# Patient Record
Sex: MALE | Race: WHITE | Employment: OTHER | URBAN - METROPOLITAN AREA
[De-identification: names, ages, dates, MRNs, and addresses within clinical notes are randomized per-mention and may not be internally consistent; named-entity substitution may affect disease eponyms.]

---

## 2022-08-17 RX ORDER — M-VIT,TX,IRON,MINS/CALC/FOLIC 27MG-0.4MG
1 TABLET ORAL DAILY
COMMUNITY

## 2022-08-17 RX ORDER — MONTELUKAST SODIUM 10 MG/1
10 TABLET ORAL NIGHTLY
COMMUNITY

## 2022-08-17 RX ORDER — POLYETHYLENE GLYCOL 3350 17 G/17G
17 POWDER, FOR SOLUTION ORAL DAILY PRN
COMMUNITY

## 2022-08-17 RX ORDER — SODIUM PHOSPHATE, DIBASIC AND SODIUM PHOSPHATE, MONOBASIC 7; 19 G/133ML; G/133ML
1 ENEMA RECTAL
COMMUNITY

## 2022-08-17 RX ORDER — OXYBUTYNIN CHLORIDE 5 MG/5ML
5 SYRUP ORAL 2 TIMES DAILY
COMMUNITY

## 2022-08-17 RX ORDER — TRIHEXYPHENIDYL HYDROCHLORIDE 2 MG/5ML
8 SYRUP ORAL DAILY
COMMUNITY

## 2022-08-17 RX ORDER — LANSOPRAZOLE 30 MG/1
30 CAPSULE, DELAYED RELEASE ORAL 2 TIMES DAILY
COMMUNITY

## 2022-08-17 RX ORDER — FAMOTIDINE 40 MG/5ML
20 POWDER, FOR SUSPENSION ORAL NIGHTLY
COMMUNITY

## 2022-08-17 RX ORDER — ACETAMINOPHEN 160 MG/5ML
15 SOLUTION ORAL EVERY 4 HOURS PRN
COMMUNITY

## 2022-08-17 RX ORDER — FLUTICASONE PROPIONATE 50 MCG
2 SPRAY, SUSPENSION (ML) NASAL DAILY
COMMUNITY

## 2022-08-17 RX ORDER — LACTULOSE 10 G/15ML
20 SOLUTION ORAL 3 TIMES DAILY
COMMUNITY

## 2022-08-17 RX ORDER — GLYCOPYRROLATE 1 MG/1
1 TABLET ORAL 4 TIMES DAILY
COMMUNITY

## 2022-08-17 NOTE — PROGRESS NOTES
[address] [facility]                                                                                                                    PRE OP INSTRUCTIONS FOR  Kim Valles        Date: 8/17/2022    Date of surgery: 8/18/2022   3pm   Arrival Time: be at hospital 1pm  come in main lobby entrance stop at information desk    Nothing by mouth (NPO) as instructed.____midnight ____________stop tube feed __midnight__________________________________________________    Take the following medications with a small sip of water on the morning of Surgery: meds to have am of procedure   trihexyphen flonase and oxybutynin    Diabetics may take evening dose of insulin but none after midnight. If you feel symptomatic or low blood sugar morning of surgery drink 1-2 ounces of apple juice only. Aspirin, Ibuprofen, Advil, Naproxen, Vitamin E and other Anti-inflammatory products should be stopped  before surgery  as directed by your physician. Take Tylenol only unless instructed otherwise by your surgeon. Check with your Doctor regarding stopping Plavix, Coumadin, Lovenox, Eliquis, Effient, or other blood thinners. Do not smoke,use illicit drugs and do not drink any alcoholic beverages 24 hours prior to surgery. You may brush your teeth the morning of surgery. DO NOT SWALLOW WATER    You MUST make arrangements for a responsible adult to take you home after your surgery. You will not be allowed to leave alone or drive yourself home. It is strongly suggested someone stay with you the first 24 hrs. Your surgery will be cancelled if you do not have a ride home. PEDIATRIC PATIENTS ONLY:  A parent/legal guardian must accompany a child scheduled for surgery and plan to stay at the hospital until the child is discharged. Please do not bring other children with you.     Please wear simple, loose fitting clothing to the hospital.  Do not bring valuables (money, credit cards, checkbooks, etc.) Do not wear any

## 2022-08-18 ENCOUNTER — ANESTHESIA (OUTPATIENT)
Dept: ENDOSCOPY | Age: 32
End: 2022-08-18
Payer: MEDICARE

## 2022-08-18 ENCOUNTER — HOSPITAL ENCOUNTER (OUTPATIENT)
Age: 32
Setting detail: OUTPATIENT SURGERY
Discharge: HOME | End: 2022-08-18
Attending: INTERNAL MEDICINE | Admitting: INTERNAL MEDICINE
Payer: MEDICARE

## 2022-08-18 ENCOUNTER — ANESTHESIA EVENT (OUTPATIENT)
Dept: ENDOSCOPY | Age: 32
End: 2022-08-18
Payer: MEDICARE

## 2022-08-18 VITALS
SYSTOLIC BLOOD PRESSURE: 121 MMHG | HEART RATE: 79 BPM | WEIGHT: 99 LBS | BODY MASS INDEX: 16.9 KG/M2 | DIASTOLIC BLOOD PRESSURE: 87 MMHG | TEMPERATURE: 98.2 F | OXYGEN SATURATION: 100 % | RESPIRATION RATE: 16 BRPM | HEIGHT: 64 IN

## 2022-08-18 DIAGNOSIS — K21.9 GASTROESOPHAGEAL REFLUX DISEASE, UNSPECIFIED WHETHER ESOPHAGITIS PRESENT: ICD-10-CM

## 2022-08-18 PROCEDURE — 7100000010 HC PHASE II RECOVERY - FIRST 15 MIN: Performed by: INTERNAL MEDICINE

## 2022-08-18 PROCEDURE — 3700000000 HC ANESTHESIA ATTENDED CARE: Performed by: INTERNAL MEDICINE

## 2022-08-18 PROCEDURE — 2580000003 HC RX 258: Performed by: NURSE ANESTHETIST, CERTIFIED REGISTERED

## 2022-08-18 PROCEDURE — 7100000011 HC PHASE II RECOVERY - ADDTL 15 MIN: Performed by: INTERNAL MEDICINE

## 2022-08-18 PROCEDURE — 88342 IMHCHEM/IMCYTCHM 1ST ANTB: CPT

## 2022-08-18 PROCEDURE — 3700000001 HC ADD 15 MINUTES (ANESTHESIA): Performed by: INTERNAL MEDICINE

## 2022-08-18 PROCEDURE — 88305 TISSUE EXAM BY PATHOLOGIST: CPT

## 2022-08-18 PROCEDURE — 2709999900 HC NON-CHARGEABLE SUPPLY: Performed by: INTERNAL MEDICINE

## 2022-08-18 PROCEDURE — 36415 COLL VENOUS BLD VENIPUNCTURE: CPT

## 2022-08-18 PROCEDURE — 3609013300 HC EGD TUBE PLACEMENT: Performed by: INTERNAL MEDICINE

## 2022-08-18 PROCEDURE — 6360000002 HC RX W HCPCS: Performed by: NURSE ANESTHETIST, CERTIFIED REGISTERED

## 2022-08-18 PROCEDURE — 3609012400 HC EGD TRANSORAL BIOPSY SINGLE/MULTIPLE: Performed by: INTERNAL MEDICINE

## 2022-08-18 RX ORDER — SODIUM CHLORIDE 0.9 % (FLUSH) 0.9 %
5-40 SYRINGE (ML) INJECTION PRN
Status: DISCONTINUED | OUTPATIENT
Start: 2022-08-18 | End: 2022-08-18 | Stop reason: HOSPADM

## 2022-08-18 RX ORDER — PROPOFOL 10 MG/ML
INJECTION, EMULSION INTRAVENOUS CONTINUOUS PRN
Status: DISCONTINUED | OUTPATIENT
Start: 2022-08-18 | End: 2022-08-18 | Stop reason: SDUPTHER

## 2022-08-18 RX ORDER — SODIUM CHLORIDE, SODIUM LACTATE, POTASSIUM CHLORIDE, CALCIUM CHLORIDE 600; 310; 30; 20 MG/100ML; MG/100ML; MG/100ML; MG/100ML
INJECTION, SOLUTION INTRAVENOUS CONTINUOUS
Status: DISCONTINUED | OUTPATIENT
Start: 2022-08-18 | End: 2022-08-18 | Stop reason: HOSPADM

## 2022-08-18 RX ORDER — PROPOFOL 10 MG/ML
INJECTION, EMULSION INTRAVENOUS PRN
Status: DISCONTINUED | OUTPATIENT
Start: 2022-08-18 | End: 2022-08-18 | Stop reason: SDUPTHER

## 2022-08-18 RX ORDER — SODIUM CHLORIDE, SODIUM LACTATE, POTASSIUM CHLORIDE, CALCIUM CHLORIDE 600; 310; 30; 20 MG/100ML; MG/100ML; MG/100ML; MG/100ML
INJECTION, SOLUTION INTRAVENOUS CONTINUOUS PRN
Status: DISCONTINUED | OUTPATIENT
Start: 2022-08-18 | End: 2022-08-18 | Stop reason: SDUPTHER

## 2022-08-18 RX ADMIN — PROPOFOL 100 MCG/KG/MIN: 10 INJECTION, EMULSION INTRAVENOUS at 15:46

## 2022-08-18 RX ADMIN — SODIUM CHLORIDE, POTASSIUM CHLORIDE, SODIUM LACTATE AND CALCIUM CHLORIDE: 600; 310; 30; 20 INJECTION, SOLUTION INTRAVENOUS at 15:37

## 2022-08-18 RX ADMIN — PROPOFOL 100 MG: 10 INJECTION, EMULSION INTRAVENOUS at 15:46

## 2022-08-18 ASSESSMENT — PAIN - FUNCTIONAL ASSESSMENT: PAIN_FUNCTIONAL_ASSESSMENT: NONE - DENIES PAIN

## 2022-08-18 NOTE — H&P
Department of Internal Medicine  Gastroenterology  Attending History and Physical      CHIEF COMPLAINT:  PEG replacement    Reason for Admission:      History Obtained From:  patient, electronic medical record    HISTORY OF PRESENT ILLNESS:      The patient is a 27 y.o. male with significant past medical history of MR and cerebral palsy who presents with nonfuctional PEG and high residuals, here for EGD and PEG replacement.     Past Medical History:        Diagnosis Date    Asthma     Cerebral palsy ([facility] [address]     spastic dystonic    Esophagitis     GERD (gastroesophageal reflux disease)     Hiatal hernia     Hypertension     Profound intellectual disability     Scoliosis     Seizure disorder ([facility] [address]      Past Surgical History:        Procedure Laterality Date    GASTROSTOMY TUBE PLACEMENT      SPINAL FUSION      scoliosis     Medications Prior to Admission:    Medications Prior to Admission: Trihexyphenidyl HCl 0.4 MG/ML SOLN, Take 8 mLs by mouth daily 8ml 8am         [id number]  and 6pm  oxybutynin (DITROPAN) 5 MG/5ML syrup, Take 5 mg by mouth 2 times daily  famotidine (PEPCID) 40 MG/5ML suspension, Take 20 mg by mouth at bedtime  montelukast (SINGULAIR) 10 MG tablet, Take 10 mg by mouth nightly Crush in 15 ml warm water  8 pm  lansoprazole (PREVACID) 30 MG delayed release capsule, Take 30 mg by mouth in the morning and at bedtime Dissolve 1 tab in 1 tsp water  8am 8pm  glycopyrrolate (ROBINUL) 1 MG tablet, 1 mg by Per G Tube route 4 times daily 2 tabs in 15 ml wam water per g tube  Multiple Vitamins-Minerals (THERAPEUTIC MULTIVITAMIN-MINERALS) tablet, 1 tablet by Per G Tube route daily 1 tab in 15 ml warm water 8am  fluticasone (FLONASE) 50 MCG/ACT nasal spray, 2 sprays by Each Nostril route daily 8am  polyethylene glycol (GLYCOLAX) 17 g packet, 17 g by Per G Tube route daily as needed for Constipation  lactulose (CHRONULAC) 10 GM/15ML solution, 20 g by Per G Tube route 3 times daily 30cc 3pm for increased ammonia levels  acetaminophen (TYLENOL) 160 MG/5ML solution, 15 mg/kg by PEG Tube route every 4 hours as needed for Fever 20ml q 8 hrs as needed  Glycerin, Laxative, (GLYCERIN, ADULT,) 2 g SUPP suppository, Place 1 suppository rectally Every 3rd day if no bowel movement  Sodium Phosphates (FLEET) 7-19 GM/118ML, Place 1 enema rectally once as needed Every 4th day if no bm  Nutritional Supplements (ISOSOURCE 1.5 MICHAEL PO), Take by mouth Vanilla 2 cans at 60 cc per hour starting 8 pm  Nutritional Supplements (ENSURE PO), Take by mouth 1 can lunch and dinner qd    Allergies:  Seasonal    Social History:   Not contributory    Family History:   Not contributory  REVIEW OF SYSTEMS:  Review of systems not obtained due to patient factors - mental status  PHYSICAL EXAM:    Vitals:  BP (!) 134/102   Pulse 95   Temp 98.5 °F (36.9 °C) (Infrared)   Resp 16   Ht 5' 4\" (1.626 m)   Wt 99 lb (44.9 kg)   SpO2 100%   BMI 16.99 kg/m²     CONSTITUTIONAL:  nonverbal  EYES:  nonicteric  NECK:  supple, symmetrical, trachea midline  ABDOMEN:  peg in place soft, non-distended, non-tender, no masses palpated, no hepatosplenomegally  MUSCULOSKELETAL:  atrophied  ASSESSMENT AND PLAN:      EGD and peg removal/replacement.     Toshia Gerard MD  8/18/2022

## 2022-08-18 NOTE — OP NOTE
Operative Note      Patient: Michael Raw  YOB: 1990  MRN: [id number]    Date of Procedure: 8/18/2022    Pre-Op Diagnosis: Gastroesophageal reflux disease, unspecified whether esophagitis present [K21.9]    Post-Op Diagnosis:  gastritis, possible SMA syndrome       Procedure(s):  EGD PEG TUBE EXCHANGE  EGD BIOPSY    Surgeon(s):  Chandler Blank MD    Assistant:   Surgical Assistant: Rashid Jeter RN; Genevieve Wolf RN    Anesthesia: Monitor Anesthesia Care    Estimated Blood Loss (mL): < 10 ml    Complications: None    Specimens:   ID Type Source Tests Collected by Time Destination   A : GASTRIC BIOPSY R/O H. PYLORI Gastric Gastric SURGICAL PATHOLOGY Chandler Blank MD 8/18/2022 1551        Implants:  * No implants in log *      Drains:   Gastrostomy/Enterostomy/Jejunostomy Tube Percutaneous Endoscopic Gastrostomy (PEG) LUQ 20 fr (Active)       Findings: see below    Detailed Description of Procedure: EGD with biopsy; PEG tube replacement  Indication    Sedation  MAC    Endoscope was advanced easily through mouth to second portion of duodenum      Oropharynx views are limited but grossly normal.    Esophagus:   Mucosa is normal.  GEJ at 40 cm. Stomach:   Antrum shows mild gastritis biopsied. PEG in proximal antrum, old bumber, no ulcerations    Gastric body is normal.    Retroflexed views show normal fundus and cardia. Duodenum: Bulb is normal.    Second portion of duodenum is normal.  Somewhat distended duodenum. Possible external compression in 3rd portion of duodenum. Mucosa is normal.    After endoscope was withdrawn old PEG was taken out and replaced with replacement AVANOS balloon PEG 20 FR. Balloon was inflated with 10 cc water. Position of replacement tube was confirmed with repeat look with endoscope. IMPRESSION AND PLAN: minimal gastritis. S/P PEG replacement. Posible external compression of 3rd portion of duodenum may be related to SMA syndrome.   If large residuals consider CT abdomen with IV contrast with evaluation of SMA origin-aortic angle and if reduced surgical consultation for possible gastrojejunal bypass. Suggest continuous feeding, L lateral decubitus position may help with residuals if bolus feeding. Follow up in office as needed.     Breann Morelos MD  8/18/2022        Electronically signed by Breann Morelos MD on 8/18/2022 at 3:57 PM

## 2022-08-18 NOTE — ANESTHESIA PRE PROCEDURE
Department of Anesthesiology  Preprocedure Note       Name:  Ana M Lancaster   Age:  27 y.o.  :  1990                                          MRN:  [id number]         Date:  2022      Surgeon: Stephan Gomez):  Bere Barclay MD    Procedure: Procedure(s):  EGD PEG TUBE EXCHANGE    Medications prior to admission:   Prior to Admission medications    Medication Sig Start Date End Date Taking?  Authorizing Provider   Trihexyphenidyl HCl 0.4 MG/ML SOLN Take 8 mLs by mouth daily 8ml 8am         [id number]  and 6pm   Yes Historical Provider, MD   oxybutynin (DITROPAN) 5 MG/5ML syrup Take 5 mg by mouth 2 times daily   Yes Historical Provider, MD   famotidine (PEPCID) 40 MG/5ML suspension Take 20 mg by mouth at bedtime   Yes Historical Provider, MD   montelukast (SINGULAIR) 10 MG tablet Take 10 mg by mouth nightly Crush in 15 ml warm water  8 pm   Yes Historical Provider, MD   lansoprazole (PREVACID) 30 MG delayed release capsule Take 30 mg by mouth in the morning and at bedtime Dissolve 1 tab in 1 tsp water  8am 8pm   Yes Historical Provider, MD   glycopyrrolate (ROBINUL) 1 MG tablet 1 mg by Per G Tube route 4 times daily 2 tabs in 15 ml wam water per g tube   Yes Historical Provider, MD   Multiple Vitamins-Minerals (THERAPEUTIC MULTIVITAMIN-MINERALS) tablet 1 tablet by Per G Tube route daily 1 tab in 15 ml warm water 8am   Yes Historical Provider, MD   fluticasone (FLONASE) 50 MCG/ACT nasal spray 2 sprays by Each Nostril route daily 8am   Yes Historical Provider, MD   polyethylene glycol (GLYCOLAX) 17 g packet 17 g by Per G Tube route daily as needed for Constipation   Yes Historical Provider, MD   lactulose (CHRONULAC) 10 GM/15ML solution 20 g by Per G Tube route 3 times daily 30cc 3pm for increased ammonia levels   Yes Historical Provider, MD   acetaminophen (TYLENOL) 160 MG/5ML solution 15 mg/kg by PEG Tube route every 4 hours as needed for Fever 20ml q 8 hrs as needed   Yes Historical Provider, MD   Glycerin, Laxative, (GLYCERIN, ADULT,) 2 g SUPP suppository Place 1 suppository rectally Every 3rd day if no bowel movement   Yes Historical Provider, MD   Sodium Phosphates (FLEET) 7-19 GM/118ML Place 1 enema rectally once as needed Every 4th day if no bm   Yes Historical Provider, MD   Nutritional Supplements (ISOSOURCE 1.5 MICHAEL PO) Take by mouth Vanilla 2 cans at 60 cc per hour starting 8 pm   Yes Historical Provider, MD   Nutritional Supplements (ENSURE PO) Take by mouth 1 can lunch and dinner qd   Yes Historical Provider, MD       Current medications:    Current Facility-Administered Medications   Medication Dose Route Frequency Provider Last Rate Last Admin    lactated ringers infusion   IntraVENous Continuous Renny Richardson MD        sodium chloride flush 0.9 % injection 5-40 mL  5-40 mL IntraVENous PRN Renny Richardson MD           Allergies: Allergies   Allergen Reactions    Seasonal      environmental       Problem List:  There is no problem list on file for this patient.       Past Medical History:        Diagnosis Date    Asthma     Cerebral palsy (HCC)     spastic dystonic    Esophagitis     GERD (gastroesophageal reflux disease)     Hiatal hernia     Hypertension     Profound intellectual disability     Scoliosis     Seizure disorder (HCC)        Past Surgical History:        Procedure Laterality Date    GASTROSTOMY TUBE PLACEMENT      SPINAL FUSION      scoliosis       Social History:    Social History     Tobacco Use    Smoking status: Not on file    Smokeless tobacco: Not on file   Substance Use Topics    Alcohol use: Not on file                                Counseling given: Not Answered      Vital Signs (Current):   Vitals:    08/17/22 1110 08/18/22 1312   BP:  (!) 134/102   Pulse:  95   Resp:  16   Temp:  98.5 °F (36.9 °C)   TempSrc:  Infrared   SpO2:  100%   Weight: 99 lb (44.9 kg)    Height: 5' 4\" (1.626 m)                                               BP Readings from Last 3 Encounters: 08/18/22 (!) 134/102       NPO Status: Time of last liquid consumption: 2200                        Time of last solid consumption: 1800                        Date of last liquid consumption: 08/17/22                        Date of last solid food consumption: 08/17/22    BMI:   Wt Readings from Last 3 Encounters:   08/17/22 99 lb (44.9 kg)     Body mass index is 16.99 kg/m². CBC: No results found for: WBC, RBC, HGB, HCT, MCV, RDW, PLT    CMP: No results found for: NA, K, CL, CO2, BUN, CREATININE, GFRAA, AGRATIO, LABGLOM, GLUCOSE, GLU, PROT, CALCIUM, BILITOT, ALKPHOS, AST, ALT    POC Tests: No results for input(s): POCGLU, POCNA, POCK, POCCL, POCBUN, POCHEMO, POCHCT in the last 72 hours. Coags: No results found for: PROTIME, INR, APTT    HCG (If Applicable): No results found for: PREGTESTUR, PREGSERUM, HCG, HCGQUANT     ABGs: No results found for: PHART, PO2ART, BEO4VTO, UTS3RJW, BEART, [id number]     Type & Screen (If Applicable):  No results found for: LABABO, LABRH    Drug/Infectious Status (If Applicable):  No results found for: HIV, HEPCAB    COVID-19 Screening (If Applicable): No results found for: COVID19        Anesthesia Evaluation  Patient summary reviewed no history of anesthetic complications:   Airway: Mallampati: Unable to assess / NA  TM distance: >3 FB   Neck ROM: full  Mouth opening: > = 3 FB   Dental: normal exam         Pulmonary:   (+) decreased breath sounds asthma:                            Cardiovascular:    (+) hypertension:,         Rhythm: regular  Rate: normal                    Neuro/Psych:   (+) seizures: well controlled, psychiatric history ( Profound intellectual disability):            GI/Hepatic/Renal:   (+) hiatal hernia, GERD:,           Endo/Other:                     Abdominal:             Vascular: negative vascular ROS. Other Findings:           Anesthesia Plan      MAC     ASA 3       Induction: intravenous.       Anesthetic plan and risks discussed with healthcare power of . Plan discussed with CRNA.               Electronically signed by Michaela Lancaster MD on August 18, 2022 at 3:09 PM.      Michaela Lancaster MD   8/18/2022    Assessment Reviewed and in agreement  JACQUI Johns - CRNA

## 2022-08-19 NOTE — ANESTHESIA POSTPROCEDURE EVALUATION
Department of Anesthesiology  Postprocedure Note    Patient: Stefany Bergman  MRN: [id number]  YOB: 1990  Date of evaluation: 8/19/2022      Procedure Summary     Date: 08/18/22 Room / Location: [address] [place] [address] / [facility]    Anesthesia Start: 9001 Anesthesia Stop: 4260    Procedures:       EGD PEG TUBE EXCHANGE      EGD BIOPSY Diagnosis:       Gastroesophageal reflux disease, unspecified whether esophagitis present      (Gastroesophageal reflux disease, unspecified whether esophagitis present [K21.9])    Surgeons: Karen Warner MD Responsible Provider: Jeannette Valdivia MD    Anesthesia Type: MAC ASA Status: 3          Anesthesia Type: No value filed.     Migdalia Phase I: Migdalia Score: 8    Migdalia Phase II: Migdalia Score: 8      Anesthesia Post Evaluation    Patient location during evaluation: PACU  Patient participation: complete - patient participated  Level of consciousness: awake and alert  Airway patency: patent  Nausea & Vomiting: no nausea and no vomiting  Complications: no  Cardiovascular status: hemodynamically stable  Respiratory status: acceptable  Hydration status: euvolemic

## (undated) DEVICE — GOWN ISOLATN REG YEL M WT MULTIPLY SIDETIE LEV 2

## (undated) DEVICE — KIT BEDSIDE REVITAL OX 500ML

## (undated) DEVICE — Device

## (undated) DEVICE — MEDI-VAC YANKAUER SUCTION HANDLE W/BULBOUS TIP: Brand: CARDINAL HEALTH

## (undated) DEVICE — LUBRICANT SURG JELLY ST BACTER TUBE 4.25OZ

## (undated) DEVICE — BLOCK BITE 60FR CAREGUARD

## (undated) DEVICE — CONTAINER SPEC COLL 960ML POLYPR TRIANG GRAD INTAKE/OUTPUT

## (undated) DEVICE — Device: Brand: DEFENDO VALVE AND CONNECTOR KIT

## (undated) DEVICE — BINDER ABD M/L H12IN FOR 46-62IN WHT 4 SLD PNL DSGN HOOP

## (undated) DEVICE — SPONGE GZ 4IN 4IN 4 PLY N WVN AVANT

## (undated) DEVICE — KENDALL 450 SERIES MONITORING FOAM ELECTRODE - RECTANGULAR SHAPE ( 3/PK): Brand: KENDALL

## (undated) DEVICE — MEDI-VAC NON-CONDUCTIVE SUCTION TUBING: Brand: CARDINAL HEALTH

## (undated) DEVICE — MASK,FACE,MAXFLUIDPROTECT,SHIELD/ERLPS: Brand: MEDLINE

## (undated) DEVICE — 6 X 9  1.75MIL 4-WALL LABGUARD: Brand: MINIGRIP COMMERCIAL LLC

## (undated) DEVICE — MIC* PERCUTANEOUS ENDOSCOPIC GASTROSTOMY PEG KIT - 20 FR - PULL: Brand: MIC PEG TUBE